# Patient Record
Sex: FEMALE | Race: WHITE | URBAN - METROPOLITAN AREA
[De-identification: names, ages, dates, MRNs, and addresses within clinical notes are randomized per-mention and may not be internally consistent; named-entity substitution may affect disease eponyms.]

---

## 2022-10-03 ENCOUNTER — OFFICE VISIT (OUTPATIENT)
Dept: URBAN - METROPOLITAN AREA CLINIC 24 | Facility: CLINIC | Age: 82
End: 2022-10-03
Payer: MEDICARE

## 2022-10-03 DIAGNOSIS — H57.02 ANISOCORIA: ICD-10-CM

## 2022-10-03 DIAGNOSIS — H43.391 OTHER VITREOUS OPACITIES, RIGHT EYE: ICD-10-CM

## 2022-10-03 DIAGNOSIS — Z96.1 PRESENCE OF INTRAOCULAR LENS: ICD-10-CM

## 2022-10-03 DIAGNOSIS — H40.013 OPEN ANGLE WITH BORDERLINE FINDINGS, LOW RISK, BILATERAL: ICD-10-CM

## 2022-10-03 DIAGNOSIS — H43.22 CRYSTALLINE DEPOSITS IN VITREOUS BODY, LEFT EYE: Primary | ICD-10-CM

## 2022-10-03 DIAGNOSIS — H52.4 PRESBYOPIA: ICD-10-CM

## 2022-10-03 PROCEDURE — 92004 COMPRE OPH EXAM NEW PT 1/>: CPT | Performed by: STUDENT IN AN ORGANIZED HEALTH CARE EDUCATION/TRAINING PROGRAM

## 2022-10-03 ASSESSMENT — VISUAL ACUITY
OD: 20/20
OS: 20/20

## 2022-10-03 ASSESSMENT — INTRAOCULAR PRESSURE
OD: 10
OS: 10

## 2022-10-03 ASSESSMENT — KERATOMETRY
OS: 46.43
OD: 46.59

## 2022-10-03 NOTE — IMPRESSION/PLAN
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 Plan: h/o concussion @8 yo bumping into a tree. 
pupil asymmetry longstanding since injury per pt.
monitor

## 2022-10-03 NOTE — IMPRESSION/PLAN
Impression: Crystalline deposits in vitreous body, left eye: H43.22. Plan: Very mild, not visually signficant.  Monitor

## 2022-10-03 NOTE — IMPRESSION/PLAN
Impression: Presence of intraocular lens: Z96.1. Plan: Pt ed on limited effect of haze on vision and treatment not necessary at this time.

## 2022-10-03 NOTE — IMPRESSION/PLAN
Impression: Open angle with borderline findings, low risk, bilateral: H40.013. Plan: 2' to c/d
IOP normal & low @10 OU today Pt ed condition, findings and risk for glaucoma which can cause irreversible vision loss. Discussed importance of f/u care. No treatment necessary at this time.

## 2023-01-20 ENCOUNTER — OFFICE VISIT (OUTPATIENT)
Dept: URBAN - METROPOLITAN AREA CLINIC 24 | Facility: CLINIC | Age: 83
End: 2023-01-20
Payer: MEDICARE

## 2023-01-20 DIAGNOSIS — H40.013 OPEN ANGLE WITH BORDERLINE FINDINGS, LOW RISK, BILATERAL: Primary | ICD-10-CM

## 2023-01-20 DIAGNOSIS — H16.223 KERATOCONJUNCTIVITIS SICCA, BILATERAL: ICD-10-CM

## 2023-01-20 DIAGNOSIS — H02.403 UNSPECIFIED PTOSIS OF BILATERAL EYELIDS: ICD-10-CM

## 2023-01-20 DIAGNOSIS — Z96.1 PRESENCE OF INTRAOCULAR LENS: ICD-10-CM

## 2023-01-20 DIAGNOSIS — H57.02 ANISOCORIA: ICD-10-CM

## 2023-01-20 PROCEDURE — 92083 EXTENDED VISUAL FIELD XM: CPT | Performed by: STUDENT IN AN ORGANIZED HEALTH CARE EDUCATION/TRAINING PROGRAM

## 2023-01-20 PROCEDURE — 99213 OFFICE O/P EST LOW 20 MIN: CPT | Performed by: STUDENT IN AN ORGANIZED HEALTH CARE EDUCATION/TRAINING PROGRAM

## 2023-01-20 PROCEDURE — 76514 ECHO EXAM OF EYE THICKNESS: CPT | Performed by: STUDENT IN AN ORGANIZED HEALTH CARE EDUCATION/TRAINING PROGRAM

## 2023-01-20 ASSESSMENT — INTRAOCULAR PRESSURE
OS: 14
OD: 14

## 2023-01-20 NOTE — IMPRESSION/PLAN
Impression: Open angle with borderline findings, low risk, bilateral: H40.013.
-- denies fhx
-- 2' to c/d
-- pach Plan: IOP reasonable @14 OU with thin-avg pach
c/d 0.55R / 0.5R
OCT RNFL x 1/2023 OD 71um / thin inf / ?plot OD for inf/minnie OS 81um / wnl 360 HVF 24-2 x 1/2023 OD rel / sup edge defect, may c/w inf thin rnfl OS rel / scattered defects Reviewed findings, cont to monitor as glc suspect but no tx recommended at this time. Watch OD closely, consider initiating tx if elevation in IOPs.  RTC for IOP check

## 2023-01-20 NOTE — IMPRESSION/PLAN
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 Plan: h/o concussion @6 yo bumping into a tree. 
pupil asymmetry longstanding since injury per pt.
monitor

## 2023-01-20 NOTE — IMPRESSION/PLAN
Impression: Keratoconjunctivitis sicca, bilateral: D84.971. Plan: Dry eyes contribute to the patient's complaints. There is no evidence of permanent changes to the cornea. Explained condition does not have a cure, is a chronic condition and will need consistent artificial tears use for maintenance.  
Start art tears qid prn OU

## 2023-01-20 NOTE — IMPRESSION/PLAN
Impression: Presence of intraocular lens: Z96.1. Plan: Vision stable, limited effect of haze on vision and treatment not necessary at this time.

## 2023-01-20 NOTE — IMPRESSION/PLAN
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 Plan: Mild OU, contributing orbital fat atrophy that causes sunken in appearance. Discussed options for consult vs observation, pt opts for observation at this time.

## 2023-06-21 ENCOUNTER — OFFICE VISIT (OUTPATIENT)
Dept: URBAN - METROPOLITAN AREA CLINIC 24 | Facility: CLINIC | Age: 83
End: 2023-06-21
Payer: MEDICARE

## 2023-06-21 DIAGNOSIS — H49.22 SIXTH [ABDUCENT] NERVE PALSY, LEFT EYE: ICD-10-CM

## 2023-06-21 DIAGNOSIS — H40.013 OPEN ANGLE WITH BORDERLINE FINDINGS, LOW RISK, BILATERAL: Primary | ICD-10-CM

## 2023-06-21 PROCEDURE — 99214 OFFICE O/P EST MOD 30 MIN: CPT | Performed by: STUDENT IN AN ORGANIZED HEALTH CARE EDUCATION/TRAINING PROGRAM

## 2023-06-21 ASSESSMENT — INTRAOCULAR PRESSURE
OS: 14
OD: 14

## 2023-06-21 NOTE — IMPRESSION/PLAN
Impression: Open angle with borderline findings, low risk, bilateral: H40.013.
-- denies fhx
-- 2' to c/d
-- pach Plan: IOP reasonable @14 OU 
   with thin-avg pach
c/d 0.55R / 0.5R
OCT RNFL x 1/2023 OD 71um / thin inf / ?plot OD for inf/minnie OS 81um / wnl 360 HVF 24-2 x 1/2023 OD rel / sup edge defect, may c/w inf thin rnfl OS rel / scattered defects Reviewed findings, cont to monitor as glc suspect but no tx recommended at this time. Watch OD closely, consider initiating tx if elevation in IOPs, for now stable.

## 2023-06-21 NOTE — IMPRESSION/PLAN
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 Plan: Per pt diplopia in left gaze, with 60% restriction of OS in abduction No diplopia in primary gaze CT 2 EP Denies any preceeding injury & denies current stroke like symptoms-- no numbness, tingling, difficulty with gait / balance. Schedule MRI of brain with contrast with focus on petrous portion of temporal bone